# Patient Record
Sex: FEMALE | Race: WHITE | NOT HISPANIC OR LATINO | Employment: OTHER | URBAN - METROPOLITAN AREA
[De-identification: names, ages, dates, MRNs, and addresses within clinical notes are randomized per-mention and may not be internally consistent; named-entity substitution may affect disease eponyms.]

---

## 2023-12-05 ENCOUNTER — APPOINTMENT (EMERGENCY)
Dept: CT IMAGING | Facility: HOSPITAL | Age: 73
End: 2023-12-05
Payer: COMMERCIAL

## 2023-12-05 ENCOUNTER — HOSPITAL ENCOUNTER (EMERGENCY)
Facility: HOSPITAL | Age: 73
Discharge: HOME | End: 2023-12-05
Attending: EMERGENCY MEDICINE
Payer: COMMERCIAL

## 2023-12-05 VITALS
HEIGHT: 68 IN | SYSTOLIC BLOOD PRESSURE: 133 MMHG | OXYGEN SATURATION: 98 % | DIASTOLIC BLOOD PRESSURE: 71 MMHG | BODY MASS INDEX: 25.01 KG/M2 | WEIGHT: 165 LBS | RESPIRATION RATE: 16 BRPM | HEART RATE: 75 BPM | TEMPERATURE: 98.5 F

## 2023-12-05 DIAGNOSIS — R11.0 NAUSEA: ICD-10-CM

## 2023-12-05 DIAGNOSIS — K57.92 ACUTE DIVERTICULITIS OF INTESTINE: Primary | ICD-10-CM

## 2023-12-05 DIAGNOSIS — K86.2 PANCREATIC CYST: ICD-10-CM

## 2023-12-05 LAB
ALBUMIN SERPL BCP-MCNC: 4.2 G/DL (ref 3.5–5)
ALP SERPL-CCNC: 74 U/L (ref 34–104)
ALT SERPL W P-5'-P-CCNC: 8 U/L (ref 7–52)
ANION GAP SERPL CALCULATED.3IONS-SCNC: 9 MMOL/L
AST SERPL W P-5'-P-CCNC: 10 U/L (ref 13–39)
BACTERIA UR QL AUTO: ABNORMAL /HPF
BASOPHILS # BLD AUTO: 0.02 THOUSANDS/ÂΜL (ref 0–0.1)
BASOPHILS NFR BLD AUTO: 0 % (ref 0–1)
BILIRUB SERPL-MCNC: 0.94 MG/DL (ref 0.2–1)
BILIRUB UR QL STRIP: NEGATIVE
BUN SERPL-MCNC: 18 MG/DL (ref 5–25)
CALCIUM SERPL-MCNC: 9.6 MG/DL (ref 8.4–10.2)
CHLORIDE SERPL-SCNC: 102 MMOL/L (ref 96–108)
CLARITY UR: CLEAR
CO2 SERPL-SCNC: 25 MMOL/L (ref 21–32)
COLOR UR: YELLOW
CREAT SERPL-MCNC: 0.76 MG/DL (ref 0.6–1.3)
EOSINOPHIL # BLD AUTO: 0.02 THOUSAND/ÂΜL (ref 0–0.61)
EOSINOPHIL NFR BLD AUTO: 0 % (ref 0–6)
ERYTHROCYTE [DISTWIDTH] IN BLOOD BY AUTOMATED COUNT: 13.1 % (ref 11.6–15.1)
GFR SERPL CREATININE-BSD FRML MDRD: 77 ML/MIN/1.73SQ M
GLUCOSE SERPL-MCNC: 111 MG/DL (ref 65–140)
GLUCOSE UR STRIP-MCNC: NEGATIVE MG/DL
HCT VFR BLD AUTO: 42.9 % (ref 34.8–46.1)
HGB BLD-MCNC: 13.7 G/DL (ref 11.5–15.4)
HGB UR QL STRIP.AUTO: NEGATIVE
IMM GRANULOCYTES # BLD AUTO: 0.05 THOUSAND/UL (ref 0–0.2)
IMM GRANULOCYTES NFR BLD AUTO: 1 % (ref 0–2)
KETONES UR STRIP-MCNC: ABNORMAL MG/DL
LEUKOCYTE ESTERASE UR QL STRIP: ABNORMAL
LYMPHOCYTES # BLD AUTO: 1.59 THOUSANDS/ÂΜL (ref 0.6–4.47)
LYMPHOCYTES NFR BLD AUTO: 14 % (ref 14–44)
MCH RBC QN AUTO: 28.8 PG (ref 26.8–34.3)
MCHC RBC AUTO-ENTMCNC: 31.9 G/DL (ref 31.4–37.4)
MCV RBC AUTO: 90 FL (ref 82–98)
MONOCYTES # BLD AUTO: 0.59 THOUSAND/ÂΜL (ref 0.17–1.22)
MONOCYTES NFR BLD AUTO: 5 % (ref 4–12)
NEUTROPHILS # BLD AUTO: 8.74 THOUSANDS/ÂΜL (ref 1.85–7.62)
NEUTS SEG NFR BLD AUTO: 80 % (ref 43–75)
NITRITE UR QL STRIP: NEGATIVE
NON-SQ EPI CELLS URNS QL MICRO: ABNORMAL /HPF
NRBC BLD AUTO-RTO: 0 /100 WBCS
PH UR STRIP.AUTO: 6.5 [PH]
PLATELET # BLD AUTO: 226 THOUSANDS/UL (ref 149–390)
PMV BLD AUTO: 9.8 FL (ref 8.9–12.7)
POTASSIUM SERPL-SCNC: 3.6 MMOL/L (ref 3.5–5.3)
PROT SERPL-MCNC: 7.8 G/DL (ref 6.4–8.4)
PROT UR STRIP-MCNC: NEGATIVE MG/DL
RBC # BLD AUTO: 4.75 MILLION/UL (ref 3.81–5.12)
RBC #/AREA URNS AUTO: ABNORMAL /HPF
SODIUM SERPL-SCNC: 136 MMOL/L (ref 135–147)
SP GR UR STRIP.AUTO: 1.01 (ref 1–1.03)
UROBILINOGEN UR STRIP-ACNC: <2 MG/DL
WBC # BLD AUTO: 11.01 THOUSAND/UL (ref 4.31–10.16)
WBC #/AREA URNS AUTO: ABNORMAL /HPF

## 2023-12-05 PROCEDURE — 80053 COMPREHEN METABOLIC PANEL: CPT | Performed by: EMERGENCY MEDICINE

## 2023-12-05 PROCEDURE — 87086 URINE CULTURE/COLONY COUNT: CPT | Performed by: EMERGENCY MEDICINE

## 2023-12-05 PROCEDURE — 99204 OFFICE O/P NEW MOD 45 MIN: CPT | Performed by: PHYSICIAN ASSISTANT

## 2023-12-05 PROCEDURE — 96375 TX/PRO/DX INJ NEW DRUG ADDON: CPT

## 2023-12-05 PROCEDURE — 74177 CT ABD & PELVIS W/CONTRAST: CPT

## 2023-12-05 PROCEDURE — 96365 THER/PROPH/DIAG IV INF INIT: CPT

## 2023-12-05 PROCEDURE — 81001 URINALYSIS AUTO W/SCOPE: CPT | Performed by: EMERGENCY MEDICINE

## 2023-12-05 PROCEDURE — 96361 HYDRATE IV INFUSION ADD-ON: CPT

## 2023-12-05 PROCEDURE — 36415 COLL VENOUS BLD VENIPUNCTURE: CPT | Performed by: EMERGENCY MEDICINE

## 2023-12-05 PROCEDURE — 99284 EMERGENCY DEPT VISIT MOD MDM: CPT

## 2023-12-05 PROCEDURE — 99285 EMERGENCY DEPT VISIT HI MDM: CPT | Performed by: EMERGENCY MEDICINE

## 2023-12-05 PROCEDURE — 85025 COMPLETE CBC W/AUTO DIFF WBC: CPT | Performed by: EMERGENCY MEDICINE

## 2023-12-05 RX ORDER — ONDANSETRON 4 MG/1
4 TABLET, ORALLY DISINTEGRATING ORAL EVERY 8 HOURS PRN
Qty: 20 TABLET | Refills: 0 | Status: SHIPPED | OUTPATIENT
Start: 2023-12-05 | End: 2023-12-12

## 2023-12-05 RX ORDER — AMOXICILLIN AND CLAVULANATE POTASSIUM 875; 125 MG/1; MG/1
1 TABLET, FILM COATED ORAL EVERY 12 HOURS
Qty: 20 TABLET | Refills: 0 | Status: SHIPPED | OUTPATIENT
Start: 2023-12-05 | End: 2023-12-15

## 2023-12-05 RX ORDER — ONDANSETRON 2 MG/ML
4 INJECTION INTRAMUSCULAR; INTRAVENOUS ONCE
Status: COMPLETED | OUTPATIENT
Start: 2023-12-05 | End: 2023-12-05

## 2023-12-05 RX ORDER — OMEPRAZOLE 20 MG/1
20 CAPSULE, DELAYED RELEASE ORAL DAILY
Qty: 14 CAPSULE | Refills: 0 | Status: SHIPPED | OUTPATIENT
Start: 2023-12-05

## 2023-12-05 RX ORDER — KETOROLAC TROMETHAMINE 30 MG/ML
15 INJECTION, SOLUTION INTRAMUSCULAR; INTRAVENOUS ONCE
Status: COMPLETED | OUTPATIENT
Start: 2023-12-05 | End: 2023-12-05

## 2023-12-05 RX ORDER — SODIUM CHLORIDE 9 MG/ML
100 INJECTION, SOLUTION INTRAVENOUS CONTINUOUS
Status: DISCONTINUED | OUTPATIENT
Start: 2023-12-05 | End: 2023-12-05 | Stop reason: HOSPADM

## 2023-12-05 RX ADMIN — PIPERACILLIN AND TAZOBACTAM 3.38 G: 3; .375 INJECTION, POWDER, LYOPHILIZED, FOR SOLUTION INTRAVENOUS at 13:05

## 2023-12-05 RX ADMIN — IOHEXOL 100 ML: 350 INJECTION, SOLUTION INTRAVENOUS at 11:20

## 2023-12-05 RX ADMIN — SODIUM CHLORIDE 100 ML/HR: 0.9 INJECTION, SOLUTION INTRAVENOUS at 13:06

## 2023-12-05 RX ADMIN — SODIUM CHLORIDE 1000 ML: 0.9 INJECTION, SOLUTION INTRAVENOUS at 10:28

## 2023-12-05 RX ADMIN — ONDANSETRON 4 MG: 2 INJECTION INTRAMUSCULAR; INTRAVENOUS at 10:29

## 2023-12-05 RX ADMIN — KETOROLAC TROMETHAMINE 15 MG: 30 INJECTION, SOLUTION INTRAMUSCULAR; INTRAVENOUS at 10:28

## 2023-12-05 NOTE — CONSULTS
Consultation - General Surgery   Aaron Sher 75 y.o. female MRN: [id number]  Unit/Bed#: Z1 H1 Encounter: [id number]    Assessment/Plan     Uncomplicated diverticulitis  -Patient with previous history of diverticulitis, 1 prior admission for diverticulitis 2008  -Symptoms similar to previous episodes  -Complains of lower abdominal pain, decreased bowel movements, no associated nausea, vomiting, fevers, chills  -No leukocytosis, VSS, pain currently improved  -CT scan reviewed, left sided inflammatory changes without perforation or abscess  -Recommend outpatient antibiotics (Augmentin) with GI follow-up, possible repeat CT scan to reassess if indicated  -Patient scheduled for colonoscopy this week, will need to be canceled due to active diverticulitis  -continue low residue diet x 2 weeks  -Discussed with patient in detail. All questions answered. Patient to return to the emergency department with any worsening or unresolving symptoms, developing fevers or chills    History of Present Illness     HPI:  Aaron Sher is a 75 y.o. female without significant past medical history other than prior diverticulitis who presented to the emergency department this morning with complaints of a 2-day history of worsening lower abdominal pain similar to previous episodes of diverticulitis. Patient complains of decreased bowel movements and decreased appetite. Has associated nausea, vomiting, fevers, chills, bloody stools, recent weight changes. Pain has improved in emergency department after receiving Toradol. Patient denies urinary symptoms. No prior history of abdominal surgery. No sick contacts. Consults    Review of Systems   Constitutional:  Positive for appetite change. Negative for chills, fever and unexpected weight change. HENT: Negative. Eyes: Negative. Respiratory: Negative. Negative for cough and shortness of breath. Cardiovascular: Negative. Negative for chest pain and palpitations. Gastrointestinal:  Positive for abdominal pain and constipation. Negative for abdominal distention, blood in stool, diarrhea, nausea and vomiting. Endocrine: Negative. Genitourinary: Negative. Negative for difficulty urinating and dysuria. Musculoskeletal: Negative. Skin: Negative. Negative for rash and wound. Allergic/Immunologic: Negative. Neurological: Negative. Negative for weakness and light-headedness. Hematological: Negative. Psychiatric/Behavioral: Negative. All other systems reviewed and are negative.       Historical Information   Past Medical History:   Diagnosis Date    Diverticulitis      Past Surgical History:   Procedure Laterality Date    BRAIN AVM REPAIR      BREAST LUMPECTOMY Left      Social History   Social History     Substance and Sexual Activity   Alcohol Use Never     Social History     Substance and Sexual Activity   Drug Use Never     E-Cigarette/Vaping    E-Cigarette Use Never User      E-Cigarette/Vaping Substances     Social History     Tobacco Use   Smoking Status Never   Smokeless Tobacco Never     Family History: non-contributory    Meds/Allergies   all current active meds have been reviewed  No Known Allergies    Objective   First Vitals:   Blood Pressure: 133/79 (12/05/23 1008)  Pulse: 90 (12/05/23 1008)  Temperature: 98.5 °F (36.9 °C) (12/05/23 1008)  Respirations: 18 (12/05/23 1008)  Height: 5' 7.75" (172.1 cm) (12/05/23 1025)  Weight - Scale: 74.8 kg (165 lb) (12/05/23 1025)  SpO2: 97 % (12/05/23 1008)    Current Vitals:   Blood Pressure: 133/71 (12/05/23 1211)  Pulse: 75 (12/05/23 1211)  Temperature: 98.5 °F (36.9 °C) (12/05/23 1008)  Respirations: 16 (12/05/23 1211)  Height: 5' 7.75" (172.1 cm) (12/05/23 1025)  Weight - Scale: 74.8 kg (165 lb) (12/05/23 1025)  SpO2: 98 % (12/05/23 1211)      Intake/Output Summary (Last 24 hours) at 12/5/2023 1352  Last data filed at 12/5/2023 1337  Gross per 24 hour   Intake 151.67 ml   Output --   Net 151.67 ml Invasive Devices       None                   Physical Exam  Constitutional:       General: She is not in acute distress. Appearance: She is well-developed. She is not diaphoretic. HENT:      Head: Normocephalic and atraumatic. Mouth/Throat:      Pharynx: No oropharyngeal exudate. Eyes:      General: No scleral icterus. Right eye: No discharge. Left eye: No discharge. Neck:      Thyroid: No thyromegaly. Vascular: No JVD. Trachea: No tracheal deviation. Cardiovascular:      Rate and Rhythm: Normal rate and regular rhythm. Heart sounds: Normal heart sounds. No murmur heard. Pulmonary:      Effort: Pulmonary effort is normal. No respiratory distress. Breath sounds: Normal breath sounds. No wheezing. Abdominal:      General: Bowel sounds are normal. There is no distension. Palpations: Abdomen is soft. Comments: Mild tenderness across lower abdomen, no rebound or guarding, no peritoneal signs   Musculoskeletal:         General: No deformity. Normal range of motion. Cervical back: Normal range of motion and neck supple. Skin:     General: Skin is warm and dry. Findings: No rash. Neurological:      Mental Status: She is alert and oriented to person, place, and time. Comments: No focal deficits   Psychiatric:         Behavior: Behavior normal.         Lab Results: I have personally reviewed pertinent lab results.   , CBC:   Lab Results   Component Value Date    WBC 11.01 (H) 12/05/2023    HGB 13.7 12/05/2023    HCT 42.9 12/05/2023    MCV 90 12/05/2023     12/05/2023    RBC 4.75 12/05/2023    MCH 28.8 12/05/2023    MCHC 31.9 12/05/2023    RDW 13.1 12/05/2023    MPV 9.8 12/05/2023    NRBC 0 12/05/2023   , CMP:   Lab Results   Component Value Date    SODIUM 136 12/05/2023    K 3.6 12/05/2023     12/05/2023    CO2 25 12/05/2023    BUN 18 12/05/2023    CREATININE 0.76 12/05/2023    CALCIUM 9.6 12/05/2023    AST 10 (L) 12/05/2023 ALT 8 12/05/2023    ALKPHOS 74 12/05/2023    EGFR 77 12/05/2023   , Coagulation: No results found for: "PT", "INR", "APTT", Urinalysis:   Lab Results   Component Value Date    COLORU Yellow 12/05/2023    CLARITYU Clear 12/05/2023    SPECGRAV 1.010 12/05/2023    PHUR 6.5 12/05/2023    LEUKOCYTESUR Moderate (A) 12/05/2023    NITRITE Negative 12/05/2023    GLUCOSEU Negative 12/05/2023    KETONESU 40 (2+) (A) 12/05/2023    BILIRUBINUR Negative 12/05/2023    BLOODU Negative 12/05/2023   , Amylase: No results found for: "AMYLASE", Lipase: No results found for: "LIPASE"  Imaging: I have personally reviewed pertinent reports. EKG, Pathology, and Other Studies: I have personally reviewed pertinent reports. Counseling / Coordination of Care  Total floor / unit time spent today 30 minutes. Greater than 50% of total time was spent with the patient and / or family counseling and / or coordination of care.   A description of the counseling / coordination of care:     Mami Hopkins

## 2023-12-05 NOTE — ED PROVIDER NOTES
History  Chief Complaint   Patient presents with    Abdominal Pain     Since yesterday, lower abd both sides but more on the left side hx of diverticulitis, I feel like I have to go to the bathroom and can't. Last BM was yesterday. But it was small and not my normal. Nausea no vomiting      76 yo female with hx of diverticulitis presenting with lower abd discomfort. States most of the time she can catch her flares before they get back, pinpoint what she ate to trigger it, rest her gut, sometimes get oral abx. Has been hospitalized once for IV abx. This time feels different. Started yesterday afternoon, less of an appetite - her one BM this am was small and hard (no blood) and has mild nausea. No abd surgical hx. No urinary symptoms. Loss of appetite with chicken noodle soup last thing she ate yesterday afternoon. History provided by:  Patient   used: No    Abdominal Pain  Pain location:  RLQ, LLQ and suprapubic  Pain quality: aching    Pain radiates to:  Does not radiate  Pain severity:  Moderate  Onset quality:  Gradual  Duration:  18 hours  Timing:  Constant  Progression:  Worsening  Chronicity:  New  Relieved by:  Nothing  Worsened by: Movement and palpation  Ineffective treatments:  None tried  Associated symptoms: anorexia, constipation and nausea    Associated symptoms: no chest pain, no chills, no cough, no diarrhea, no dysuria, no fatigue, no fever, no hematuria, no shortness of breath, no sore throat and no vomiting    Risk factors: has not had multiple surgeries        None       Past Medical History:   Diagnosis Date    Diverticulitis        Past Surgical History:   Procedure Laterality Date    BRAIN AVM REPAIR      BREAST LUMPECTOMY Left        History reviewed. No pertinent family history. I have reviewed and agree with the history as documented.     E-Cigarette/Vaping    E-Cigarette Use Never User      E-Cigarette/Vaping Substances     Social History     Tobacco Use Smoking status: Never    Smokeless tobacco: Never   Vaping Use    Vaping Use: Never used   Substance Use Topics    Alcohol use: Never    Drug use: Never       Review of Systems   Constitutional:  Negative for chills, fatigue and fever. HENT:  Negative for ear pain and sore throat. Eyes:  Negative for pain and visual disturbance. Respiratory:  Negative for cough and shortness of breath. Cardiovascular:  Negative for chest pain and palpitations. Gastrointestinal:  Positive for abdominal pain (lower), anorexia, constipation and nausea. Negative for diarrhea and vomiting. Genitourinary:  Negative for dysuria and hematuria. Musculoskeletal:  Negative for arthralgias and back pain. Skin:  Negative for color change and rash. Neurological:  Negative for seizures and syncope. All other systems reviewed and are negative. Physical Exam  Physical Exam  Vitals and nursing note reviewed. Constitutional:       General: She is not in acute distress. Appearance: She is well-developed. HENT:      Head: Normocephalic and atraumatic. Eyes:      Extraocular Movements: Extraocular movements intact. Conjunctiva/sclera: Conjunctivae normal.   Cardiovascular:      Rate and Rhythm: Normal rate and regular rhythm. Heart sounds: No murmur heard. Pulmonary:      Effort: Pulmonary effort is normal. No respiratory distress. Breath sounds: Normal breath sounds. Abdominal:      Palpations: Abdomen is soft. Tenderness: There is abdominal tenderness (mild) in the right lower quadrant, suprapubic area and left lower quadrant. Musculoskeletal:         General: No swelling. Cervical back: Neck supple. Skin:     General: Skin is warm and dry. Capillary Refill: Capillary refill takes less than 2 seconds. Neurological:      Mental Status: She is alert.    Psychiatric:         Mood and Affect: Mood normal.         Vital Signs  ED Triage Vitals [12/05/23 1008]   Temperature Pulse Respirations Blood Pressure SpO2   98.5 °F (36.9 °C) 90 18 133/79 97 %      Temp src Heart Rate Source Patient Position - Orthostatic VS BP Location FiO2 (%)   -- Monitor Sitting Left arm --      Pain Score       5           Vitals:    12/05/23 1008 12/05/23 1211   BP: 133/79 133/71   Pulse: 90 75   Patient Position - Orthostatic VS: Sitting Lying         Visual Acuity      ED Medications  Medications   sodium chloride 0.9 % infusion (0 mL/hr Intravenous Stopped 12/5/23 1337)   sodium chloride 0.9 % bolus 1,000 mL (0 mL Intravenous Stopped 12/5/23 1211)   ondansetron (ZOFRAN) injection 4 mg (4 mg Intravenous Given 12/5/23 1029)   ketorolac (TORADOL) injection 15 mg (15 mg Intravenous Given 12/5/23 1028)   iohexol (OMNIPAQUE) 350 MG/ML injection (MULTI-DOSE) 100 mL (100 mL Intravenous Given 12/5/23 1120)   piperacillin-tazobactam (ZOSYN) IVPB 3.375 g (0 g Intravenous Stopped 12/5/23 1333)       Diagnostic Studies  Results Reviewed       Procedure Component Value Units Date/Time    Urine Microscopic [[id number]]  (Abnormal) Collected: 12/05/23 1028    Lab Status: Final result Specimen: Urine, Clean Catch Updated: 12/05/23 1052     RBC, UA None Seen /hpf      WBC, UA 10-20 /hpf      Epithelial Cells Occasional /hpf      Bacteria, UA Occasional /hpf     Urine culture [[id number]] Collected: 12/05/23 1028    Lab Status:  In process Specimen: Urine, Clean Catch Updated: 12/05/23 1052    Comprehensive metabolic panel [[id number]]  (Abnormal) Collected: 12/05/23 1028    Lab Status: Final result Specimen: Blood from Arm, Right Updated: 12/05/23 1049     Sodium 136 mmol/L      Potassium 3.6 mmol/L      Chloride 102 mmol/L      CO2 25 mmol/L      ANION GAP 9 mmol/L      BUN 18 mg/dL      Creatinine 0.76 mg/dL      Glucose 111 mg/dL      Calcium 9.6 mg/dL      AST 10 U/L      ALT 8 U/L      Alkaline Phosphatase 74 U/L      Total Protein 7.8 g/dL      Albumin 4.2 g/dL      Total Bilirubin 0.94 mg/dL      eGFR 77 ml/min/1.73sq m Narrative:      National Kid[employer] Foundation guidelines for Chronic Kidney Disease (CKD):     Stage 1 with normal or high GFR (GFR > 90 mL/min/1.73 square meters)    Stage 2 Mild CKD (GFR = 60-89 mL/min/1.73 square meters)    Stage 3A Moderate CKD (GFR = 45-59 mL/min/1.73 square meters)    Stage 3B Moderate CKD (GFR = 30-44 mL/min/1.73 square meters)    Stage 4 Severe CKD (GFR = 15-29 mL/min/1.73 square meters)    Stage 5 End Stage CKD (GFR <15 mL/min/1.73 square meters)  Note: GFR calculation is accurate only with a steady state creatinine    UA w Reflex to Microscopic w Reflex to Culture [[id number]]  (Abnormal) Collected: 12/05/23 1028    Lab Status: Final result Specimen: Urine, Clean Catch Updated: 12/05/23 1042     Color, UA Yellow     Clarity, UA Clear     Specific Gravity, UA 1.010     pH, UA 6.5     Leukocytes, UA Moderate     Nitrite, UA Negative     Protein, UA Negative mg/dl      Glucose, UA Negative mg/dl      Ketones, UA 40 (2+) mg/dl      Urobilinogen, UA <2.0 mg/dl      Bilirubin, UA Negative     Occult Blood, UA Negative    CBC and differential [[id number]]  (Abnormal) Collected: 12/05/23 1028    Lab Status: Final result Specimen: Blood from Arm, Right Updated: 12/05/23 1035     WBC 11.01 Thousand/uL      RBC 4.75 Million/uL      Hemoglobin 13.7 g/dL      Hematocrit 42.9 %      MCV 90 fL      MCH 28.8 pg      MCHC 31.9 g/dL      RDW 13.1 %      MPV 9.8 fL      Platelets 745 Thousands/uL      nRBC 0 /100 WBCs      Neutrophils Relative 80 %      Immat GRANS % 1 %      Lymphocytes Relative 14 %      Monocytes Relative 5 %      Eosinophils Relative 0 %      Basophils Relative 0 %      Neutrophils Absolute 8.74 Thousands/µL      Immature Grans Absolute 0.05 Thousand/uL      Lymphocytes Absolute 1.59 Thousands/µL      Monocytes Absolute 0.59 Thousand/µL      Eosinophils Absolute 0.02 Thousand/µL      Basophils Absolute 0.02 Thousands/µL                    CT abdomen pelvis with contrast   Final Result by Mary Vo MD (12/05 1219)      1. Findings compatible with acute diverticulitis at the sigmoid colon. Scattered small associated fluid collections some which appear partially loculated. Recommend continued follow-up with CT to assess for possible developing abscess formation. 2.  Pancreatic tail cyst measures 1.1 cm. This has been described on an outside CT report dated 8/9/2018 as well. For simple cyst(s) less than 1.5 cm, recommend followup every 2 year for 5 times or to age 80, whichever comes first. Followup can stop at    age 80 or can switch over to 80 year or older algorithm. Recommend next followup in 2 years. Preferred imaging modality: abdomen MRI and MRCP with and without IV contrast, or triple phase abdomen CT with IV contrast, or abdomen MRI and MRCP without IV    contrast.      3.  Rounded lucency partially visualized noted within the left proximal femur intramedullary cavity. Question postsurgical but an underlying osseous lesion is not excluded. Correlate with surgical history. Recommend further evaluation beginning with    left femur radiographs non emergently. The study was marked in [facility] for immediate notification. Workstation performed: [id number]                    Procedures  Procedures         ED Course  ED Course as of 12/05/23 1346   Tue Dec 05, 2023   1011 Pt seen and examined. 74 yo female with hx of diverticulitis presenting with lower abd discomfort. States most of the time she can catch her flares before they get back, pinpoint what she ate to trigger it, rest her gut, sometimes get oral abx. Has been hospitalized once for IV abx. This time feels different. Started yesterday afternoon, less of an appetite - her one BM this am was small and hard (no blood) and has mild nausea. No abd surgical hx. No urinary symptoms. Loss of appetite with chicken noodle soup last thing she ate yesterday afternoon.   Will check labs, urine, CT a/p and give IVF, toradol and zofran. [address] reviewed and ok. WBC 11   1111 Pt taken for CT a/p.   1222 CT shows 1. Findings compatible with acute diverticulitis at the sigmoid colon. Scattered small associated fluid collections some which appear partially loculated. Recommend continued follow-up with CT to assess for possible developing abscess formation. 2.  Pancreatic tail cyst measures 1.1 cm. This has been described on an outside CT report dated 8/9/2018 as well. For simple cyst(s) less than 1.5 cm, recommend followup every 2 year for 5 times or to age 80, whichever comes first. Followup can stop at age 80 or can switch over to 80 year or older algorithm. Recommend next followup in 2 years. Preferred imaging modality: abdomen MRI and MRCP with and without IV contrast, or triple phase abdomen CT with IV contrast, or abdomen MRI and MRCP without IV   contrast.     3.  Rounded lucency partially visualized noted within the left proximal femur intramedullary cavity. Question postsurgical but an underlying osseous lesion is not excluded. Correlate with surgical history. Recommend further evaluation beginning with left femur radiographs non emergently. Reviewed findings with pt - she understands need to stay, but takes care of  since he got out of hospital this summer with colostomy bag (due to be changed tonight and she is the only one who knows how to do it). States she would rather go home but is willing to do whatever she needs to. Knows about pancreatic cyst and has had known Benign Cartilaginous Tumor L femur. IV zosyn ordered. Reviewed findings with surgery who reviewed imaging and is seeing pt.   1324 Dr Joel Soler reviewed CT and feels pt can go. Surgical PA down talking with pt - will send on Zofran, Pepcid, Augmentin and need repeat CT a/p by GI in 2 weeks. IV zosyn finishing. SBIRT 20yo+      Flowsheet Row Most Recent Value   Initial Alcohol Screen: US AUDIT-C     1.  How often do you have a drink containing alcohol? 0 Filed at: 12/05/2023 1010   2. How many drinks containing alcohol do you have on a typical day you are drinking? 0 Filed at: 12/05/2023 1010   3b. FEMALE Any Age, or MALE 65+: How often do you have 4 or more drinks on one occassion? 0 Filed at: 12/05/2023 1010   Audit-C Score 0 Filed at: 12/05/2023 1010   LISANDRA: How many times in the past year have you. .. Used an illegal drug or used a prescription medication for non-medical reasons? Never Filed at: 12/05/2023 1010                      Medical Decision Making  Amount and/or Complexity of Data Reviewed  Labs: ordered. Radiology: ordered. Risk  Prescription drug management. Disposition  Final diagnoses:   Acute diverticulitis of intestine   Nausea   Pancreatic cyst     Time reflects when diagnosis was documented in both MDM as applicable and the Disposition within this note       Time User Action Codes Description Comment    12/5/2023  1:31 PM Earna Paci Add [K57.92] Acute diverticulitis of intestine     12/5/2023  1:31 PM Earna Paci Add [R11.0] Nausea     12/5/2023  1:31 PM Earna Paci Add [K86.2] Pancreatic cyst           ED Disposition       ED Disposition   Discharge    Condition   Stable    Date/Time   Tue Dec 5, 2023 Henry Galojose Renutemo Pankajzhang discharge to home/self care.                    Follow-up Information       Follow up With Specialties Details Why [address], [place] Family Medicine In 1 week  [address] [facility],[address]      Pam Escoto MD   call and cancel colonoscopy for thursday but let them know that surgeon who saw you in ER wanted you to get repeat CT a/p in 2 weeks and see them in follow up [address] [address] [phone]              Patient's Medications   Discharge Prescriptions    AMOXICILLIN-CLAVULANATE (AUGMENTIN) 875-125 MG PER TABLET    Take 1 tablet by mouth every 12 (twelve) hours for 10 days Start Date: 12/5/2023 End Date: 12/15/2023       Order Dose: 1 tablet       Quantity: 20 tablet    Refills: 0    OMEPRAZOLE (PRILOSEC) 20 MG DELAYED RELEASE CAPSULE    Take 1 capsule (20 mg total) by mouth daily       Start Date: 12/5/2023 End Date: --       Order Dose: 20 mg       Quantity: 14 capsule    Refills: 0    ONDANSETRON (ZOFRAN-ODT) 4 MG DISINTEGRATING TABLET    Take 1 tablet (4 mg total) by mouth every 8 (eight) hours as needed for nausea or vomiting for up to 7 days       Start Date: 12/5/2023 End Date: 12/12/2023       Order Dose: 4 mg       Quantity: 20 tablet    Refills: 0       No discharge procedures on file.     PDMP Review       None            ED Provider  Electronically Signed by             Trevin Roman DO  12/05/23 8125

## 2023-12-06 LAB — BACTERIA UR CULT: NORMAL
